# Patient Record
Sex: FEMALE | Race: WHITE | NOT HISPANIC OR LATINO | Employment: OTHER | URBAN - METROPOLITAN AREA
[De-identification: names, ages, dates, MRNs, and addresses within clinical notes are randomized per-mention and may not be internally consistent; named-entity substitution may affect disease eponyms.]

---

## 2017-08-22 ENCOUNTER — OFFICE VISIT (OUTPATIENT)
Dept: OBSTETRICS AND GYNECOLOGY | Facility: CLINIC | Age: 42
End: 2017-08-22

## 2017-08-22 VITALS
SYSTOLIC BLOOD PRESSURE: 116 MMHG | HEART RATE: 91 BPM | DIASTOLIC BLOOD PRESSURE: 76 MMHG | HEIGHT: 64 IN | RESPIRATION RATE: 14 BRPM | BODY MASS INDEX: 30.9 KG/M2 | OXYGEN SATURATION: 97 % | WEIGHT: 181 LBS

## 2017-08-22 DIAGNOSIS — R10.2 PELVIC PAIN IN FEMALE: ICD-10-CM

## 2017-08-22 DIAGNOSIS — Z01.419 ENCOUNTER FOR GYNECOLOGICAL EXAMINATION WITHOUT ABNORMAL FINDING: Primary | ICD-10-CM

## 2017-08-22 PROCEDURE — 99386 PREV VISIT NEW AGE 40-64: CPT | Performed by: OBSTETRICS & GYNECOLOGY

## 2017-08-22 RX ORDER — OMEPRAZOLE 40 MG/1
CAPSULE, DELAYED RELEASE ORAL
COMMUNITY
Start: 2017-06-07 | End: 2020-09-17

## 2017-08-22 RX ORDER — BUPROPION HYDROCHLORIDE 300 MG/1
TABLET ORAL
Refills: 0 | COMMUNITY
Start: 2017-06-30 | End: 2018-08-28 | Stop reason: ALTCHOICE

## 2017-08-22 RX ORDER — FLUTICASONE PROPIONATE 50 MCG
SPRAY, SUSPENSION (ML) NASAL
Refills: 0 | COMMUNITY
Start: 2017-07-25 | End: 2022-09-28

## 2017-08-22 RX ORDER — ONDANSETRON 4 MG/1
TABLET, FILM COATED ORAL
Refills: 0 | COMMUNITY
Start: 2017-08-14 | End: 2020-09-17

## 2017-08-22 NOTE — PROGRESS NOTES
Subjective   Chief Complaint   Patient presents with   • Gynecologic Exam     Severe RLQ pain for 2 months with nausea.     Katerin Luna is a 41 y.o. year old  presenting to be seen for her annual exam.     SEXUAL Hx:  She is currently sexually active.  In the past year there has not been new sexual partners.    Condoms are not typically used.  She would not like to be screened for STD's at today's exam.  Current birth control method: IUD - Mirena.  She is happy with her current method of contraception and does not want to discuss alternative methods of contraception.  MENSTRUAL Hx:  No LMP recorded (lmp unknown). Patient has had an implant.  In the past 6 months her cycles have been regular, predictable and occur monthly.  Her menstrual flow is normal.   Each month on average there are roughly 0 day(s) of very heavy flow.    Intermenstrual bleeding is absent.    Post-coital bleeding is absent.  Dysmenorrhea: is not affecting her activities of daily living  PMS: is not affecting her activities of daily living  Her cycles are not a source of concern for her that she wishes to discuss today.  HEALTH Hx:  She exercises regularly: no (and has no plans to become more active).  She wears her seat belt: yes.  She has concerns about domestic violence: no.  OTHER COMPLAINTS:  She notes a 2 month history of irregular and intermittent right lower quadrant pain.  She denies radiation or aggravating/alleviating factors.  She describes the pain is lasting usually between 1-3 minutes and is 6 out of 10 in intensity at its worst    The following portions of the patient's history were reviewed and updated as appropriate:problem list, current medications, allergies, past family history, past medical history, past social history and past surgical history.    Smoking status: Never Smoker                                                              Smokeless status: Never Used                        Review of  "Systems  Constitutional POS: nothing reported    NEG: anorexia or night sweats   Gastointestinal POS: nothing reported    NEG: bloating, change in bowel habits, melena or reflux symptoms   Genitourinary POS: see HPI    NEG: dysuria or hematuria   Integument POS: nothing reported    NEG: moles that are changing in size, shape, color or rashes   Breast POS: nothing reported    NEG: persistent breast lump, skin dimpling or nipple discharge        Objective   /76  Pulse 91  Resp 14  Ht 64\" (162.6 cm)  Wt 181 lb (82.1 kg)  LMP  (LMP Unknown) Comment: IUD  SpO2 97%  Breastfeeding? No  BMI 31.07 kg/m2    General:  well developed; well nourished  no acute distress   Skin:  No suspicious lesions seen   Thyroid: normal to inspection and palpation   Breasts:  Examined in supine position  Symmetric without masses or skin dimpling  Nipples normal without inversion, lesions or discharge  There are no palpable axillary nodes  Fibrocystic changes are present both breasts without a discrete mass   Abdomen: soft, non-tender; no masses  no umbilical or inginual hernias are present  no hepato-splenomegaly   Pelvis: Clinical staff was present for exam  External genitalia:  normal appearance of the external genitalia including Bartholin's and [place]'s glands.  :  urethral meatus normal; urethral hypermobility is absent.  Vaginal:  normal pink mucosa without prolapse or lesions.  Cervix:   normal appearance. Pap smear collected  Uterus:  normal size, shape and consistency.  Adnexa:  tenderness of the right adnexa to  superficial and deep palpation;        Assessment   1. Well woman exam  2. Right-sided pelvic pain     Plan   1. Await Pap smear and ultrasound results for plan of care.  Patient will otherwise return to clinic in 1 year or on an as-needed basis.      New Medications Ordered This Visit   Medications   • buPROPion XL (WELLBUTRIN XL) 300 MG 24 hr tablet     Refill:  0   • fluticasone (FLONASE) 50 MCG/ACT nasal " spray     Sig: instill 1 spray into each nostril twice a day     Refill:  0   • omeprazole (priLOSEC) 40 MG capsule   • ondansetron (ZOFRAN) 4 MG tablet     Sig: take 1 tablet by mouth UP TO three times a day     Refill:  0          This note was electronically signed.    Vitaly Villanueva MD MBA  August 22, 2017

## 2017-09-18 ENCOUNTER — OFFICE VISIT (OUTPATIENT)
Dept: OBSTETRICS AND GYNECOLOGY | Facility: CLINIC | Age: 42
End: 2017-09-18

## 2017-09-18 VITALS
SYSTOLIC BLOOD PRESSURE: 116 MMHG | DIASTOLIC BLOOD PRESSURE: 80 MMHG | HEART RATE: 96 BPM | OXYGEN SATURATION: 98 % | HEIGHT: 64 IN | WEIGHT: 180.8 LBS | BODY MASS INDEX: 30.87 KG/M2 | RESPIRATION RATE: 14 BRPM

## 2017-09-18 DIAGNOSIS — Z30.430 ENCOUNTER FOR INSERTION OF INTRAUTERINE CONTRACEPTIVE DEVICE: Primary | ICD-10-CM

## 2017-09-18 PROCEDURE — 58300 INSERT INTRAUTERINE DEVICE: CPT | Performed by: OBSTETRICS & GYNECOLOGY

## 2017-09-18 PROCEDURE — 58301 REMOVE INTRAUTERINE DEVICE: CPT | Performed by: OBSTETRICS & GYNECOLOGY

## 2017-09-18 RX ORDER — ATOMOXETINE 25 MG/1
25 CAPSULE ORAL DAILY
COMMUNITY
End: 2020-09-17

## 2017-09-18 NOTE — PROGRESS NOTES
IUD Removal and Immediate Reinsertion    No LMP recorded (lmp unknown). Patient has had an implant.    Date of procedure:  9/18/2017    Risks and benefits discussed? yes  All questions answered? yes  Consents given by the patient  Written consent obtained? yes  Reason for removal: Device expiration    Local anesthesia used:  yes - 5 cc's of  Meds; anesthesia local: 5% lidocaine ointment    Procedure documentation:    A speculum was placed in order to view the cervix.  A tenaculum did not need to be placed on the anterior cervical lip.  Cervical dilation did not need to be performed in order to access the string.  The IUD string was easily seen.  The string was grasped and the IUD was removed without difficulty.  The IUD did not appear to be adherent to the uterine cavity. It was removed intact.    A sterile speculum was replaced and the cervix was cleansed with an antiseptic solution.  Vaginal discharge was scant.  The anterior lip of the cervix was grasped with a tenaculum and the uterine cavity was gently sounded.  There was slight difficulty passing the sound through the cervix.  Cervical dilation did need to be performed prior to placing the IUD.  The uterus was anteverted and sounded to 7 cms.  The Mirena was then prepared per the manufacturers instructions.    The Mirena was advanced to a point 2 cms from the fundus and then the arms were released from the sheath.  The device was advanced to the fundus and the device was released fully from the sheath.. The string was cut 2 cms in length.  Bleeding from the cervix was scant.    She tolerated the procedure without any difficulty.     Post procedure instructions: It was reviewed that the Mirena will not alter the timing of when she bleeds but it may decrease the quantity of flow and cramps.  Roughly 30% of people will be amenorrheic over time.  Efficacy rate of 99.2% over 5 years was discussed.  Spontaneous expulsion rate of 1-2% was also discussed.  If she has  any issue with fever or excessive bleeding or pain she is to call the office immediately.  Otherwise I would like to see her back in 6 weeks with an ultrasound to confirm correct placement.    This note was electronically signed.    Vitaly Villanueva M.D. ALEXANDER  September 18, 2017

## 2018-08-28 ENCOUNTER — OFFICE VISIT (OUTPATIENT)
Dept: OBSTETRICS AND GYNECOLOGY | Facility: CLINIC | Age: 43
End: 2018-08-28

## 2018-08-28 VITALS
HEIGHT: 64 IN | RESPIRATION RATE: 14 BRPM | BODY MASS INDEX: 29.71 KG/M2 | WEIGHT: 174 LBS | SYSTOLIC BLOOD PRESSURE: 102 MMHG | DIASTOLIC BLOOD PRESSURE: 62 MMHG

## 2018-08-28 DIAGNOSIS — Z01.419 WELL WOMAN EXAM WITH ROUTINE GYNECOLOGICAL EXAM: Primary | ICD-10-CM

## 2018-08-28 DIAGNOSIS — Z01.419 WELL WOMAN EXAM WITH ROUTINE GYNECOLOGICAL EXAM: ICD-10-CM

## 2018-08-28 PROCEDURE — 99396 PREV VISIT EST AGE 40-64: CPT | Performed by: OBSTETRICS & GYNECOLOGY

## 2018-08-28 RX ORDER — NALTREXONE HYDROCHLORIDE AND BUPROPION HYDROCHLORIDE 8; 90 MG/1; MG/1
TABLET, EXTENDED RELEASE ORAL
Refills: 5 | COMMUNITY
Start: 2018-07-09

## 2018-08-28 RX ORDER — AMOXICILLIN AND CLAVULANATE POTASSIUM 875; 125 MG/1; MG/1
1 TABLET, FILM COATED ORAL 2 TIMES DAILY
Refills: 0 | COMMUNITY
Start: 2018-08-27 | End: 2020-09-17

## 2018-08-28 RX ORDER — LORATADINE AND PSEUDOEPHEDRINE SULFATE 10; 240 MG/1; MG/1
1 TABLET, EXTENDED RELEASE ORAL DAILY
Refills: 0 | COMMUNITY
Start: 2018-08-27 | End: 2022-09-28

## 2018-08-28 NOTE — PROGRESS NOTES
Subjective   Chief Complaint   Patient presents with   • Gynecologic Exam     No complaints     Katerin Luna is a 42 y.o. year old  presenting to be seen for her annual exam.     SEXUAL Hx:  She is currently sexually active.  In the past year there has not been new sexual partners.    Condoms are not typically used.  She would not like to be screened for STD's at today's exam.  Current birth control method: IUD - Mirena.  She is happy with her current method of contraception and does not want to discuss alternative methods of contraception.  MENSTRUAL Hx:  No LMP recorded (lmp unknown). Patient has had an implant.  In the past 6 months her cycles have been absent.  Her menstrual flow is normal.   Each month on average there are roughly 0 day(s) of very heavy flow.    Intermenstrual bleeding is absent.    Post-coital bleeding is absent.  Dysmenorrhea: is not affecting her activities of daily living  PMS: is not affecting her activities of daily living  Her cycles are not a source of concern for her that she wishes to discuss today.  HEALTH Hx:  She exercises regularly: no (and has no plans to become more active).  She wears her seat belt: yes.  She has concerns about domestic violence: no.  OTHER COMPLAINTS:  Nothing else    The following portions of the patient's history were reviewed and updated as appropriate:problem list, current medications, allergies, past family history, past medical history, past social history and past surgical history.    Smoking status: Never Smoker                                                              Smokeless tobacco: Never Used                        Review of Systems  Constitutional POS: nothing reported    NEG: anorexia or night sweats   Gastointestinal POS: nothing reported    NEG: bloating, change in bowel habits, melena or reflux symptoms   Genitourinary POS: nothing reported    NEG: dysuria or hematuria   Integument POS: nothing reported    NEG: moles that are  "changing in size, shape, color or rashes   Breast POS: nothing reported    NEG: persistent breast lump, skin dimpling or nipple discharge        Objective   /62   Resp 14   Ht 162.6 cm (64\")   Wt 78.9 kg (174 lb)   LMP  (LMP Unknown) Comment: Mirena IUD  Breastfeeding? No   BMI 29.87 kg/m²     General:  well developed; well nourished  no acute distress   Skin:  No suspicious lesions seen   Thyroid: normal to inspection and palpation   Breasts:  Examined in supine position  Symmetric without masses or skin dimpling  Nipples normal without inversion, lesions or discharge  There are no palpable axillary nodes   Abdomen: soft, non-tender; no masses  no umbilical or inginual hernias are present  no hepato-splenomegaly   Pelvis: Clinical staff was present for exam  External genitalia:  normal appearance of the external genitalia including Bartholin's and [place]'s glands.  :  urethral meatus normal;  Vaginal:  normal pink mucosa without prolapse or lesions.  Cervix:   normal appearance. Pap smear collected  Uterus:  normal size, shape and consistency.  Adnexa:  normal bimanual exam of the adnexa.        Assessment   1. Well woman exam     Plan   1. Await pap and mammo results for plan of care. Will RTC in 1 year or prn.      No orders of the defined types were placed in this encounter.         This note was electronically signed.    Vitaly Villanueva MD MBA  August 28, 2018  "

## 2020-09-17 ENCOUNTER — OFFICE VISIT (OUTPATIENT)
Dept: OBSTETRICS AND GYNECOLOGY | Facility: CLINIC | Age: 45
End: 2020-09-17

## 2020-09-17 VITALS — DIASTOLIC BLOOD PRESSURE: 70 MMHG | SYSTOLIC BLOOD PRESSURE: 110 MMHG | WEIGHT: 178 LBS | BODY MASS INDEX: 30.55 KG/M2

## 2020-09-17 DIAGNOSIS — N39.3 SUI (STRESS URINARY INCONTINENCE, FEMALE): ICD-10-CM

## 2020-09-17 DIAGNOSIS — N91.5 HYPOMENORRHEA: Primary | ICD-10-CM

## 2020-09-17 PROBLEM — Z97.5 IUD (INTRAUTERINE DEVICE) IN PLACE: Status: ACTIVE | Noted: 2020-09-17

## 2020-09-17 PROCEDURE — 99396 PREV VISIT EST AGE 40-64: CPT | Performed by: OBSTETRICS & GYNECOLOGY

## 2020-09-17 RX ORDER — HYDROCHLOROTHIAZIDE 12.5 MG/1
12.5 TABLET ORAL DAILY PRN
COMMUNITY
Start: 2020-09-02

## 2020-09-17 NOTE — PROGRESS NOTES
Subjective   Chief Complaint   Patient presents with   • Annual Exam     Katerin Luna is a 44 y.o. year old  presenting to be seen for her annual exam.    Current birth control method: IUD - Mirena. Placed 2017  She had a normal Pap co-test .  She had some fairly regular Pap smears although she is status post what sounds like laser conization when she was 19 years old and then I reviewed Dr. Lagunas's records and it looks like he did a LEEP conization in .  No pathology report is available.  Did review that he also removed the left tube and ovary for a 4 to 5 cm simple cyst.    No LMP recorded. Patient has had an implant.    She is sexually active.   Condoms are not typically used.    [place] is painful or she is having problems :no  She has concerns about domestic violence: no.    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    Current Outpatient Medications:   •  CLARITIN-D 24 HOUR  MG per 24 hr tablet, Take 1 tablet by mouth Daily., Disp: , Rfl: 0  •  CONTRAVE 8-90 MG tablet, TAKE 2 TABLETS IN THE MORNING AND 2 TABLETS EVERY EVENING, Disp: , Rfl: 5  •  fluticasone (FLONASE) 50 MCG/ACT nasal spray,  instill 1 spray into each nostril twice a day, Disp: , Rfl: 0  •  hydroCHLOROthiazide (HYDRODIURIL) 12.5 MG tablet, Take 12.5 mg by mouth Daily As Needed., Disp: , Rfl:   •  metFORMIN (GLUCOPHAGE) 500 MG tablet, , Disp: , Rfl: 0  •  spironolactone (ALDACTONE) 100 MG tablet, take 1 tablet by mouth twice a day, Disp: , Rfl: 0    Review of systems  Constitutional    POS nothing reported                            NEG anorexia, fevers, night sweats or sweats  Breast                POS nothing reported and had normal mammogram in the past year - results are not in record for review                            NEG persistent breast lump, skin dimpling or nipple discharge  GI                      POS nothing reported                            NEG bloating, change in bowel habits, melena or reflux symptoms                       POS LISSETTE is present but it IS NOT effecting her ADL's                            NEG dysuria, frequency or hematuria         Objective   /70   Wt 80.7 kg (178 lb)   Breastfeeding No   BMI 30.55 kg/m²       General:  well developed; well nourished  no acute distress  appears stated age   Skin:  No suspicious lesions seen   Thyroid:    Breasts:  Examined in supine position  Symmetric without masses or skin dimpling  Nipples normal without inversion, lesions or discharge  Fibrocystic changes are present both breasts without a discrete mass  Bilateral breast reduction scars are noted   Abdomen: soft, non-tender; no masses  no umbilical or inguinal hernias are present  no hepato-splenomegaly   Pelvis: Clinical staff was present for exam  External genitalia:  normal appearance of the external genitalia including Bartholin's and [place]'s glands.  :  urethral meatus normal;  Vaginal:  normal pink mucosa without prolapse or lesions. she is able to perform a Kegel contraction upon request;  Cervix:  normal appearance. IUD string present - 2 cms in length;  Uterus:  normal size, shape and  consistency. anteverted;  Adnexa:  normal bimanual exam of the adnexa.  Rectal:  digital rectal exam not performed; anus visually normal appearing.       Lab Review   Pap test and PATHOLOGY      Imaging  No data reviewed       Assessment     1. Normal GYN examination with hypomenorrhea due to Mirena IUD placed 2017  2. Stress urinary continence with cough laugh sneezing.  Was able to develop moderately weak Kegel exercise.  3. Status post breast reduction bilaterally.  She gets her mammograms at [facility] and is up-to-date  4. Discussed calcium to help prevent osteoporosis  5. Colonoscopy protocols changed may be candidate next year  6. Normal Pap co-test 2018 I would repeat next year             Plan     1. Annual examination or sooner as needed; Pap co-test 2021  2. Timed voiding every 2 2 and half hours Kegel's for 5 minutes daily and as needed  3. 1000 mg calcium in divided doses ideally in diet; regular exercise  4. Self breast awareness discussed              No orders of the defined types were placed in this encounter.    No orders of the defined types were placed in this encounter.          This note was electronically signed.    Gurmeet Lloyd MD  9/17/2020

## 2021-03-18 ENCOUNTER — BULK ORDERING (OUTPATIENT)
Dept: CASE MANAGEMENT | Facility: OTHER | Age: 46
End: 2021-03-18

## 2021-03-18 DIAGNOSIS — Z23 IMMUNIZATION DUE: ICD-10-CM

## 2021-09-20 ENCOUNTER — OFFICE VISIT (OUTPATIENT)
Dept: OBSTETRICS AND GYNECOLOGY | Facility: CLINIC | Age: 46
End: 2021-09-20

## 2021-09-20 VITALS
WEIGHT: 189 LBS | BODY MASS INDEX: 32.44 KG/M2 | RESPIRATION RATE: 16 BRPM | SYSTOLIC BLOOD PRESSURE: 118 MMHG | DIASTOLIC BLOOD PRESSURE: 80 MMHG

## 2021-09-20 DIAGNOSIS — Z01.419 ENCOUNTER FOR WELL WOMAN EXAM WITH ROUTINE GYNECOLOGICAL EXAM: Primary | ICD-10-CM

## 2021-09-20 DIAGNOSIS — Z30.431 IUD CHECK UP: ICD-10-CM

## 2021-09-20 DIAGNOSIS — Z12.11 SCREENING FOR COLON CANCER: ICD-10-CM

## 2021-09-20 DIAGNOSIS — N39.46 URINARY INCONTINENCE, MIXED: ICD-10-CM

## 2021-09-20 DIAGNOSIS — Z11.3 SCREENING FOR VENEREAL DISEASE: ICD-10-CM

## 2021-09-20 PROCEDURE — 99396 PREV VISIT EST AGE 40-64: CPT | Performed by: NURSE PRACTITIONER

## 2021-09-20 NOTE — PROGRESS NOTES
Annual Visit     Patient Name: Katerin Luna  : 1975   MRN: [id number]   Care Team: Patient Care Team:  Stefani Zuniga MD as PCP - General (Internal Medicine)    Chief Complaint:    Chief Complaint   Patient presents with   • Annual Exam       HPI: Katerin Luna is a 45 y.o. year old  presenting to be seen for her gynecologic exam.   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      Objective     Physical Exam    Neuro: alert and oriented to person, place and time   General:  alert; cooperative; well developed; well nourished   Skin:  No suspicious lesions seen   Thyroid: normal to inspection and palpation   Lungs:  breathing is unlabored  clear to auscultation bilaterally   Heart:  regular rate and rhythm, S1, S2 normal, no murmur, click, rub or gallop  normal apical impulse   Breasts:  Examined in supine position  Symmetric without masses or skin dimpling  Nipples normal without inversion, lesions or discharge  There are no palpable axillary nodes  Fibrocystic changes are present both breasts without a discrete mass   Abdomen: soft, non-tender; no masses  no umbilical or inguinal hernias are present  no hepato-splenomegaly   Pelvis: Clinical staff was present for exam  External genitalia:  normal appearance of the external genitalia including Bartholin's and [place]'s glands.  :  urethral meatus  normal;  Vaginal:  normal pink mucosa without prolapse or lesions.  Cervix:  normal appearance. IUD string present - 2 cms in length;  Uterus:  normal size, shape and consistency.  Adnexa:  normal bimanual exam of the adnexa.  Rectal:  digital rectal exam not performed; anus visually normal appearing.     S/p Left oophorectomy   Bilateral breast reduction     Assessment / Plan      Assessment  Problems Addressed This Visit    ICD-10-CM ICD-9-CM   1. Encounter for well woman exam with routine gynecological exam  Z01.419 V72.31   2. IUD check up  Z30.431 V25.42   3. Screening for venereal disease  Z11.3 V74.5   4. Urinary incontinence, mixed  N39.46 788.33   5. Screening for colon cancer  Z12.11 V76.51       Plan    Pap smear pending   STD screening pending     Discussed monthly SBEs and importance of annual imaging     Discussed mgmt options for mixed UI   Discussed pelvic floor PT and/or medication for urgency   She isn't interested at this time but will call if she changes her mind before next yr   Discussed timed voiding, Kegels, and bending forward     Cologuard order given   She will check insurance benefits first- handout given     AV 1 yr             Follow Up  Return in about 1 year (around 9/20/2022) for Annual physical.  Patient was given instructions and counseling regarding her condition or for health maintenance advice. Please see specific information pulled into the AVS if appropriate.     Celia Anderson, KEVIN  September 20, 2021  10:40 EDT

## 2022-06-15 ENCOUNTER — TELEPHONE (OUTPATIENT)
Dept: OBSTETRICS AND GYNECOLOGY | Facility: CLINIC | Age: 47
End: 2022-06-15

## 2022-06-15 NOTE — TELEPHONE ENCOUNTER
Caller: Ramon Muñoz    Relationship: Self    Best call back number:    What form or medical record are you requesting: RESULTS FROM [facility] (9/20/21)    Who is requesting this form or medical record from you: RAMON MUÑOZ    How would you like to receive the form or medical records (pick-up, mail, fax): PT WILL  AT OFFICE   PT IS REQUESTING TO  AT THE OFFICE [address] ,[address]    Timeframe paperwork needed: AS SOON AS POSSIBLE - NEED RECORDS IF POSSIBLE BY 11:30AM TODAY 6/15/2022    Additional notes: PLEASE CALL PT BACK TO CONFIRM WHEN PAPERWORK IS AVAILABLE FOR . OKAY TO LEAVE A VOICEMAIL.

## 2022-09-22 ENCOUNTER — TELEPHONE (OUTPATIENT)
Dept: OBSTETRICS AND GYNECOLOGY | Facility: CLINIC | Age: 47
End: 2022-09-22

## 2022-09-22 NOTE — TELEPHONE ENCOUNTER
Provider: LOREE DUKE  Caller: RAMON MUÑOZ  Relationship to Patient: SELF    #[phone]    PT IS SICK. SAME DAY CANCEL

## 2022-09-28 ENCOUNTER — OFFICE VISIT (OUTPATIENT)
Dept: OBSTETRICS AND GYNECOLOGY | Facility: CLINIC | Age: 47
End: 2022-09-28

## 2022-09-28 VITALS
RESPIRATION RATE: 16 BRPM | WEIGHT: 169 LBS | BODY MASS INDEX: 29.01 KG/M2 | SYSTOLIC BLOOD PRESSURE: 110 MMHG | DIASTOLIC BLOOD PRESSURE: 70 MMHG

## 2022-09-28 DIAGNOSIS — N60.12 FIBROCYSTIC BREAST CHANGES, BILATERAL: ICD-10-CM

## 2022-09-28 DIAGNOSIS — N64.52 DISCHARGE FROM LEFT NIPPLE: ICD-10-CM

## 2022-09-28 DIAGNOSIS — N95.1 MENOPAUSAL SYMPTOMS: ICD-10-CM

## 2022-09-28 DIAGNOSIS — Z30.431 IUD CHECK UP: ICD-10-CM

## 2022-09-28 DIAGNOSIS — N60.11 FIBROCYSTIC BREAST CHANGES, BILATERAL: ICD-10-CM

## 2022-09-28 DIAGNOSIS — Z01.419 ENCOUNTER FOR GYNECOLOGICAL EXAMINATION WITHOUT ABNORMAL FINDING: Primary | ICD-10-CM

## 2022-09-28 PROCEDURE — 99396 PREV VISIT EST AGE 40-64: CPT | Performed by: NURSE PRACTITIONER

## 2022-09-28 NOTE — PROGRESS NOTES
Annual Visit     Patient Name: Katerin Luna  : 1975   MRN: [id number]   Care Team: Patient Care Team:  Stefani Zuniga MD as PCP - General (Internal Medicine)  Celia Anderson APRN as Nurse Practitioner (Nurse Practitioner)    Chief Complaint:    Chief Complaint   Patient presents with   • Annual Exam     ?nipple discharge?       HPI: Katerin Luna is a 46 y.o. year old  presenting to be seen for her gynecologic exam.   Pap smear 2021 WNL and HPV negative      Mammogram 2022 at [facility] WNL per pt   Reports Left breast nipple d/c one episode in July   States it occurred during intense nipple stimulation with sexual activity   She didn't see the d/c and neither did her partner, but states he tasted it   She has no breast pain or changes on SBEs   Hx left breast lumpectomy in  - benign per pt   States she just had labs with PCP within the last 6 months including TSH which was WNL     Reports menopausal sx for several months   Bothersome night sweats      Mirena placed 2017 - amenorrhea with method      Hx left oophorectomy   LEEP        Subjective      /70   Resp 16   Wt 76.7 kg (169 lb)   Breastfeeding No   BMI 29.01 kg/m²     BMI reviewed: Body mass index is 29.01 kg/m².      Objective     Physical Exam    Neuro: alert and oriented to person, place and time   General:  alert; cooperative; well developed; well nourished   Skin:  No suspicious lesions seen   Thyroid: normal to inspection and palpation   Lungs:  breathing is unlabored  clear to auscultation bilaterally   Heart:  regular rate and rhythm, S1, S2 normal, no murmur, click, rub or gallop  normal apical impulse   Breasts:  Examined in supine position  Symmetric without masses or skin dimpling  Nipples normal without inversion, lesions or discharge  There are no palpable axillary nodes  Fibrocystic changes are present both breasts without a discrete mass  s/p bilateral reduction   Abdomen: soft, non-tender; no  masses  no umbilical or inguinal hernias are present  no hepato-splenomegaly   Pelvis: Clinical staff was present for exam  External genitalia:  normal appearance of the external genitalia including Bartholin's and [place]'s glands.  :  urethral meatus normal;  Vaginal:  normal pink mucosa without prolapse or lesions.  Cervix:  normal appearance. IUD string present - 2 cms in length;  Uterus:  normal size, shape and consistency.  Adnexa:  RIGHT adnexa normal; LEFT adnexa absent  Rectal:  digital rectal exam not performed; anus visually normal appearing.         Assessment / Plan      Assessment  Problems Addressed This Visit    ICD-10-CM ICD-9-CM   1. Encounter for gynecological examination without abnormal finding  Z01.419 V72.31   2. IUD check up  Z30.431 V25.42   3. Discharge from left nipple  N64.52 611.79   4. Fibrocystic breast changes, bilateral  N60.11 610.1    N60.12    5. Menopausal symptoms  N95.1 627.2       Plan    Pap smear not indicated   Discussed monthly SBEs and fibrocystic breast changes   Discussed left nipple d/c - considering it was not visualized, will order left breast dx mammogram   Check Prolactin as well   Will call with results     Discussed Mirena now indicated for 8 yrs for contraception     Discussed perimenopause vs menopause  Check FSH, estradiol, and LH   Lab orders given, she will have done at PCP office in [place]     AV 1 yr         40 to 64: Counseling/Anticipatory Guidance Discussed: contraception, screenings and self-breast exam    Follow Up  Return in about 1 year (around 9/28/2023) for Annual physical.  Patient was given instructions and counseling regarding her condition or for health maintenance advice. Please see specific information pulled into the AVS if appropriate.     Celia Anderson, KEVIN  September 28, 2022  11:03 EDT

## 2023-02-03 LAB
ESTRADIOL SERPL-MCNC: 34.5 PG/ML
FSH SERPL-ACNC: 66.9 MIU/ML
LH SERPL-ACNC: 55.6 MIU/ML
PROLACTIN SERPL-MCNC: 19.9 NG/ML (ref 4.8–23.3)

## 2023-12-05 ENCOUNTER — OFFICE VISIT (OUTPATIENT)
Dept: OBSTETRICS AND GYNECOLOGY | Facility: CLINIC | Age: 48
End: 2023-12-05
Payer: COMMERCIAL

## 2023-12-05 VITALS
SYSTOLIC BLOOD PRESSURE: 120 MMHG | DIASTOLIC BLOOD PRESSURE: 80 MMHG | BODY MASS INDEX: 28.85 KG/M2 | HEIGHT: 64 IN | WEIGHT: 169 LBS

## 2023-12-05 DIAGNOSIS — N95.1 MENOPAUSAL SYMPTOMS: ICD-10-CM

## 2023-12-05 DIAGNOSIS — N39.3 SUI (STRESS URINARY INCONTINENCE, FEMALE): ICD-10-CM

## 2023-12-05 DIAGNOSIS — Z11.3 ROUTINE SCREENING FOR STI (SEXUALLY TRANSMITTED INFECTION): ICD-10-CM

## 2023-12-05 DIAGNOSIS — Z01.419 WELL WOMAN EXAM WITH ROUTINE GYNECOLOGICAL EXAM: Primary | ICD-10-CM

## 2023-12-05 PROBLEM — N91.5 HYPOMENORRHEA: Status: RESOLVED | Noted: 2020-09-17 | Resolved: 2023-12-05

## 2023-12-05 RX ORDER — ESTRADIOL 1 MG/1
1 TABLET ORAL DAILY
Qty: 30 TABLET | Refills: 12 | Status: SHIPPED | OUTPATIENT
Start: 2023-12-05

## 2023-12-05 RX ORDER — METHYLPREDNISOLONE 4 MG/1
TABLET ORAL DAILY
COMMUNITY
Start: 2023-11-20

## 2023-12-05 RX ORDER — PROGESTERONE 200 MG/1
200 CAPSULE ORAL DAILY
Qty: 90 CAPSULE | Refills: 3 | Status: SHIPPED | OUTPATIENT
Start: 2023-12-05

## 2023-12-05 RX ORDER — ATOMOXETINE 40 MG/1
40 CAPSULE ORAL DAILY
COMMUNITY
Start: 2023-11-28
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
## 2024-01-03 DIAGNOSIS — N95.1 MENOPAUSAL SYMPTOMS: ICD-10-CM

## 2024-01-03 RX ORDER — ESTRADIOL 1 MG/1
1 TABLET ORAL DAILY
Qty: 90 TABLET | Refills: 3 | Status: SHIPPED | OUTPATIENT
Start: 2024-01-03

## 2024-03-04 ENCOUNTER — TELEPHONE (OUTPATIENT)
Dept: OBSTETRICS AND GYNECOLOGY | Facility: CLINIC | Age: 49
End: 2024-03-04
Payer: COMMERCIAL

## 2024-03-04 DIAGNOSIS — N95.1 MENOPAUSAL SYMPTOMS: ICD-10-CM

## 2024-03-04 RX ORDER — PROGESTERONE 200 MG/1
200 CAPSULE ORAL DAILY
Qty: 90 CAPSULE | Refills: 3 | OUTPATIENT
Start: 2024-03-04

## 2024-03-04 RX ORDER — ESTRADIOL 1 MG/1
1 TABLET ORAL DAILY
Qty: 90 TABLET | Refills: 3 | OUTPATIENT
Start: 2024-03-04

## 2024-03-04 NOTE — TELEPHONE ENCOUNTER
Caller: Katerin Luna    Relationship: Self    Best call back number: [phone]    Requested Prescriptions:   Requested Prescriptions     Pending Prescriptions Disp Refills    estradiol (Estrace) 1 MG tablet 90 tablet 3     Sig: Take 1 tablet by mouth Daily.    Progesterone (PROMETRIUM) 200 MG capsule 90 capsule 3     Sig: Take 1 capsule by mouth Daily.        Pharmacy where request should be sent: [facility]/PHARMACY #[id number] - [address] [phone] [facility] [phone] FX     Last office visit with prescribing clinician: 12/5/2023   Last telemedicine visit with prescribing clinician: Visit date not found   Next office visit with prescribing clinician: 4/17/2024     Additional details provided by patient:     PT HAD TO R/S ANNUAL AND WILL NEED REFILL TO GET TO NEXT APPT    Does the patient have less than a 3 day supply:  [x] Yes  [] No    Would you like a call back once the refill request has been completed: [x] Yes [] No    If the office needs to give you a call back, can they leave a voicemail: [x] Yes [] No    Tania Ross Rep   03/04/24 10:44 EST

## 2024-03-04 NOTE — TELEPHONE ENCOUNTER
Spoke with pt and advised her that she should have refills at the pharmacy.  Pt verbalized understanding and had no questions at this time.    Per KEVIN Gordillo:  She should have active refills at her pharmacy.  Looks like they were refilled in January and then December.

## 2024-04-17 ENCOUNTER — OFFICE VISIT (OUTPATIENT)
Dept: OBSTETRICS AND GYNECOLOGY | Facility: CLINIC | Age: 49
End: 2024-04-17
Payer: COMMERCIAL

## 2024-04-17 VITALS
SYSTOLIC BLOOD PRESSURE: 110 MMHG | BODY MASS INDEX: 28.71 KG/M2 | WEIGHT: 168.2 LBS | DIASTOLIC BLOOD PRESSURE: 80 MMHG | HEIGHT: 64 IN

## 2024-04-17 DIAGNOSIS — N95.1 MENOPAUSAL SYMPTOMS: ICD-10-CM

## 2024-04-17 RX ORDER — SEMAGLUTIDE 1.34 MG/ML
0.5 INJECTION, SOLUTION SUBCUTANEOUS
COMMUNITY
Start: 2024-01-11

## 2024-04-17 NOTE — PROGRESS NOTES
"Subjective   Chief Complaint   Patient presents with    Follow-up      3 month  HRT AND LISSETTE follow up      Katerin Luna is a 48 y.o. year old .  No LMP recorded (lmp unknown). Patient has had an implant.  She presents to be seen for follow up on HRT for decreased libido, vaginal atrophy and lissette symptoms. She had previously had HRT management through [facility]. She was wanting to switch to gyn management. She was started on 1mg estrace po and 200mg progesterone po in Dec. She has a mirena iud which has been in place since 2017.  She has mostly been doing well with her replacement however she noticed that she continues to have irritability and mood changes.  She states she feels this is actually increased since starting her HRT.  She has a shorter fuse and is more likely to be confrontational.  She did denies any increase in her libido with estrogen and progesterone use.  Her last testosterone pellets were about 6 months ago.  She is not planning to have any further implants that she would like to keep her hormonal care through her GYN.  She is interested in testosterone cream use.  She denies any vaginal bleeding.    The following portions of the patient's history were reviewed and updated as appropriate:current medications and allergies    Social History    Tobacco Use      Smoking status: Never      Smokeless tobacco: Never         Objective   /80 (BP Location: Left arm, Patient Position: Sitting, Cuff Size: Adult)   Ht 162.6 cm (64\")   Wt 76.3 kg (168 lb 3.2 oz)   LMP  (LMP Unknown) Comment: mirena  BMI 28.87 kg/m²     Lab Review   No data reviewed    Imaging   No data reviewed        Assessment   Menopausal symptoms suboptimally controlled on current therapy     Plan   Will continue estrogen and progesterone at current dose.  Will add in 1 g of testosterone cream daily.  Patient will MyChart message provider in 4 to 6 weeks with update on status.  The importance of keeping all planned " follow-up and taking all medications as prescribed was emphasized.  Return to care for annual in December or as needed    No orders of the defined types were placed in this encounter.         This note was electronically signed.    Annelise Collado, KEVIN  April 17, 2024

## 2024-04-22 ENCOUNTER — TELEPHONE (OUTPATIENT)
Dept: OBSTETRICS AND GYNECOLOGY | Facility: CLINIC | Age: 49
End: 2024-04-22
Payer: COMMERCIAL

## 2024-04-22 NOTE — TELEPHONE ENCOUNTER
----- Message from Anneilse Collado CNM sent at 4/22/2024  2:39 PM EDT -----  Regarding: FW: Testosterone Cream  Contact: [phone]  Please call her pharmacy with testosterone cream order, thanks  KEVIN May  ----- Message -----  From: Xiao Sinha MA  Sent: 4/22/2024   2:32 PM EDT  To: Annelise Collado CNM  Subject: FW: Testosterone Cream                             ----- Message -----  From: Katerin Luna  Sent: 4/22/2024   2:29 PM EDT  To: [facility]  Subject: Testosterone Cream                               I spoke with a tech. The compound pharmacist hasn’t had anything come through.  The tech said it might be best to do a call in, verbal over the phone. You may call [phone].     Thank you!  Katerin Luna  1975

## 2024-04-22 NOTE — TELEPHONE ENCOUNTER
Per Annelise I called [facility] [phone]  for Katerin  to try and get testosterone cream ordered I was told to call  back and ask for Gillian and when I did she was busy was I was asked to call by again in 20 mins. I called to tell Katerin was was going on and Katerin asked me to call [facility]  to see if they could fill the RX and she could pick it up on her way home tomorrow. Katerin is aware that the pharmacy will reach out to her about the RX with cost and when they could have it ready for .Katerin verbally understood.

## 2024-04-23 ENCOUNTER — TELEPHONE (OUTPATIENT)
Dept: OBSTETRICS AND GYNECOLOGY | Facility: CLINIC | Age: 49
End: 2024-04-23
Payer: COMMERCIAL

## 2024-04-23 NOTE — TELEPHONE ENCOUNTER
I called and spoke the pharmacist  Analy at  and  pharmacy and gave them Annelise MILLAN number for the testosterone Cream.

## 2024-04-23 NOTE — TELEPHONE ENCOUNTER
----- Message from Katerin Luna sent at 4/23/2024  1:32 PM EDT -----  Regarding: Testosterone Cream  Contact: [phone]  I spoke with [facility] in [place]. They have tried reaching out to get more information  just an FYI. You can call them [phone]

## 2024-06-06 DIAGNOSIS — N95.1 MENOPAUSAL SYMPTOMS: ICD-10-CM

## 2024-06-06 NOTE — TELEPHONE ENCOUNTER
JANNET    Patient called in requesting a refill of the EC-RX Testosterone 0.2% cream      Pharmacy:  [facility]  [phone] Phone      Patient Phone:  [phone]

## 2024-12-08 DIAGNOSIS — N95.1 MENOPAUSAL SYMPTOMS: ICD-10-CM

## 2025-02-26 ENCOUNTER — OFFICE VISIT (OUTPATIENT)
Dept: OBSTETRICS AND GYNECOLOGY | Facility: CLINIC | Age: 50
End: 2025-02-26
Payer: COMMERCIAL

## 2025-02-26 VITALS
SYSTOLIC BLOOD PRESSURE: 110 MMHG | HEIGHT: 64 IN | WEIGHT: 167.8 LBS | DIASTOLIC BLOOD PRESSURE: 80 MMHG | BODY MASS INDEX: 28.65 KG/M2

## 2025-02-26 DIAGNOSIS — N39.3 SUI (STRESS URINARY INCONTINENCE, FEMALE): ICD-10-CM

## 2025-02-26 DIAGNOSIS — Z01.419 WELL WOMAN EXAM WITH ROUTINE GYNECOLOGICAL EXAM: Primary | ICD-10-CM

## 2025-02-26 DIAGNOSIS — N95.1 MENOPAUSAL SYMPTOMS: ICD-10-CM

## 2025-02-26 RX ORDER — ESTRADIOL 1 MG/1
1 TABLET ORAL DAILY
Qty: 90 TABLET | Refills: 3 | Status: SHIPPED | OUTPATIENT
Start: 2025-02-26

## 2025-02-26 RX ORDER — PROGESTERONE 200 MG/1
200 CAPSULE ORAL DAILY
Qty: 90 CAPSULE | Refills: 3 | OUTPATIENT
Start: 2025-02-26

## 2025-02-26 RX ORDER — SPIRONOLACTONE 100 MG/1
100 TABLET, FILM COATED ORAL 2 TIMES DAILY
Qty: 90 TABLET | Refills: 3 | Status: SHIPPED | OUTPATIENT
Start: 2025-02-26

## 2025-02-26 RX ORDER — PROGESTERONE 200 MG/1
200 CAPSULE ORAL DAILY
Qty: 90 CAPSULE | Refills: 3 | Status: SHIPPED | OUTPATIENT
Start: 2025-02-26

## 2025-02-26 RX ORDER — AZELASTINE HYDROCHLORIDE 137 UG/1
SPRAY, METERED NASAL
COMMUNITY

## 2025-02-26 RX ORDER — PANTOPRAZOLE SODIUM 40 MG/1
40 TABLET, DELAYED RELEASE ORAL DAILY
COMMUNITY
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
## 2025-02-27 LAB — REF LAB TEST METHOD: NORMAL

## 2025-05-29 ENCOUNTER — PRIOR AUTHORIZATION (OUTPATIENT)
Dept: OBSTETRICS AND GYNECOLOGY | Facility: CLINIC | Age: 50
End: 2025-05-29
Payer: COMMERCIAL

## 2025-05-29 NOTE — TELEPHONE ENCOUNTER
PA requested for Progesterone 200 mg capsules.  PA completed 5/29/25 on Cover My Meds.  Prior auth approved by [facility] 2017.